# Patient Record
Sex: FEMALE | URBAN - METROPOLITAN AREA
[De-identification: names, ages, dates, MRNs, and addresses within clinical notes are randomized per-mention and may not be internally consistent; named-entity substitution may affect disease eponyms.]

---

## 2017-04-26 ENCOUNTER — OFFICE VISIT (OUTPATIENT)
Dept: FAMILY MEDICINE | Facility: CLINIC | Age: 32
End: 2017-04-26
Payer: COMMERCIAL

## 2017-04-26 VITALS
SYSTOLIC BLOOD PRESSURE: 100 MMHG | WEIGHT: 174.8 LBS | HEART RATE: 75 BPM | DIASTOLIC BLOOD PRESSURE: 60 MMHG | BODY MASS INDEX: 32.17 KG/M2 | OXYGEN SATURATION: 99 % | TEMPERATURE: 97.9 F | HEIGHT: 62 IN

## 2017-04-26 DIAGNOSIS — Z00.00 ROUTINE GENERAL MEDICAL EXAMINATION AT A HEALTH CARE FACILITY: Primary | ICD-10-CM

## 2017-04-26 DIAGNOSIS — R53.83 FATIGUE, UNSPECIFIED TYPE: ICD-10-CM

## 2017-04-26 LAB
ERYTHROCYTE [DISTWIDTH] IN BLOOD BY AUTOMATED COUNT: 15.8 % (ref 10–15)
HCT VFR BLD AUTO: 35.1 % (ref 35–47)
HGB BLD-MCNC: 10.9 G/DL (ref 11.7–15.7)
MCH RBC QN AUTO: 21.5 PG (ref 26.5–33)
MCHC RBC AUTO-ENTMCNC: 31.1 G/DL (ref 31.5–36.5)
MCV RBC AUTO: 69 FL (ref 78–100)
PLATELET # BLD AUTO: 306 10E9/L (ref 150–450)
RBC # BLD AUTO: 5.07 10E12/L (ref 3.8–5.2)
WBC # BLD AUTO: 7.7 10E9/L (ref 4–11)

## 2017-04-26 PROCEDURE — G0145 SCR C/V CYTO,THINLAYER,RESCR: HCPCS | Performed by: FAMILY MEDICINE

## 2017-04-26 PROCEDURE — 82306 VITAMIN D 25 HYDROXY: CPT | Performed by: FAMILY MEDICINE

## 2017-04-26 PROCEDURE — 82947 ASSAY GLUCOSE BLOOD QUANT: CPT | Performed by: FAMILY MEDICINE

## 2017-04-26 PROCEDURE — 36415 COLL VENOUS BLD VENIPUNCTURE: CPT | Performed by: FAMILY MEDICINE

## 2017-04-26 PROCEDURE — 84443 ASSAY THYROID STIM HORMONE: CPT | Performed by: FAMILY MEDICINE

## 2017-04-26 PROCEDURE — 99395 PREV VISIT EST AGE 18-39: CPT | Performed by: FAMILY MEDICINE

## 2017-04-26 PROCEDURE — 87624 HPV HI-RISK TYP POOLED RSLT: CPT | Performed by: FAMILY MEDICINE

## 2017-04-26 PROCEDURE — 85027 COMPLETE CBC AUTOMATED: CPT | Performed by: FAMILY MEDICINE

## 2017-04-26 NOTE — PROGRESS NOTES
SUBJECTIVE:     CC: Luma Marks is an 31 year old woman who presents for preventive health visit.     Healthy Habits:    Do you get at least three servings of calcium containing foods daily (dairy, green leafy vegetables, etc.)? yes    Amount of exercise or daily activities, outside of work: 2 day(s) per week    Problems taking medications regularly not applicable    Medication side effects: No    Have you had an eye exam in the past two years? yes    Do you see a dentist twice per year? no    Do you have sleep apnea, excessive snoring or daytime drowsiness?yes    Having periods for only 1-2 days during cycle. Worried that she has gained a lot of weight. Currently she is not physically active. Reports of fatigue. After 1-2 hours of work she cannot work anymore. Does not work. Has a 4-year-old child at home        Today's PHQ-2 Score:   PHQ-2 ( 1999 Pf[employer]) 4/26/2017 9/22/2016   Q1: Little interest or pleasure in doing things 0 0   Q2: Feeling down, depressed or hopeless 0 0   PHQ-2 Score 0 0       Abuse: Current or Past(Physical, Sexual or Emotional)- No  Do you feel safe in your environment - Yes    Social History   Substance Use Topics     Smoking status: Never Smoker     Smokeless tobacco: Never Used     Alcohol use No     The patient does not drink >3 drinks per day nor >7 drinks per week.    No results for input(s): CHOL, HDL, LDL, TRIG, CHOLHDLRATIO, NHDL in the last 99203 hours.    Reviewed orders with patient.  Reviewed health maintenance and updated orders accordingly - Yes    Mammo Decision Support:  Mammogram not appropriate for this patient based on age.    Pertinent mammograms are reviewed under the imaging tab.  History of abnormal Pap smear: NO - age 30- 65 PAP every 3 years recommended    Reviewed and updated as needed this visit by clinical staff  Tobacco  Allergies  Meds  Med Hx  Soc Hx        Reviewed and updated as needed this visit by Provider  Tobacco  Allergies  Med Hx  Soc Hx     "       ROS:  C: NEGATIVE for fever, chills, change in weight  I: NEGATIVE for worrisome rashes, moles or lesions  E: NEGATIVE for vision changes or irritation  ENT: NEGATIVE for ear, mouth and throat problems  R: NEGATIVE for significant cough or SOB  B: NEGATIVE for masses, tenderness or discharge  CV: NEGATIVE for chest pain, palpitations or peripheral edema  GI: NEGATIVE for nausea, abdominal pain, heartburn, or change in bowel habits  : NEGATIVE for unusual urinary or vaginal symptoms. Periods are regular.  M: NEGATIVE for significant arthralgias or myalgia  N: NEGATIVE for weakness, dizziness or paresthesias  P: NEGATIVE for changes in mood or affect    Patient Active Problem List   Diagnosis   (none) - all problems resolved or deleted     No past surgical history on file.    Social History   Substance Use Topics     Smoking status: Never Smoker     Smokeless tobacco: Never Used     Alcohol use No     Family History   Problem Relation Age of Onset     Family History Negative Mother      DIABETES Father      Hypertension Father          No current outpatient prescriptions on file.     No Known Allergies  OBJECTIVE:     /60 (BP Location: Left arm, Cuff Size: Adult Regular)  Pulse 75  Temp 97.9  F (36.6  C) (Tympanic)  Ht 5' 2\" (1.575 m)  Wt 174 lb 12.8 oz (79.3 kg)  LMP 04/12/2017  SpO2 99%  BMI 31.97 kg/m2  EXAM:  GENERAL: healthy, alert and no distress  EYES: Eyes grossly normal to inspection, PERRL and conjunctivae and sclerae normal  HENT: ear canals and TM's normal, nose and mouth without ulcers or lesions  NECK: no adenopathy, no asymmetry, masses, or scars and thyroid normal to palpation  RESP: lungs clear to auscultation - no rales, rhonchi or wheezes  BREAST: normal without masses, tenderness or nipple discharge and no palpable axillary masses or adenopathy  CV: regular rate and rhythm, normal S1 S2, no S3 or S4, no murmur, click or rub, no peripheral edema and peripheral pulses " "strong  ABDOMEN: soft, nontender, no hepatosplenomegaly, no masses and bowel sounds normal   (female): normal female external genitalia, normal urethral meatus, vaginal mucosa pink, moist, well rugated, and normal cervix/adnexa/uterus without masses or discharge  MS: no gross musculoskeletal defects noted, no edema  SKIN: no suspicious lesions or rashes  NEURO: Normal strength and tone, mentation intact and speech normal  PSYCH: mentation appears normal, affect normal/bright    ASSESSMENT/PLAN:     1. Routine general medical examination at a health care facility  Screening Pap smear obtained.  - Pap imaged thin layer screen with HPV - recommended age 30 - 65  - HPV High Risk Types DNA Cervical    2. Fatigue, unspecified type  questionable etiology. Patient reassured that as long as her periods are once a month, I have no concerns, about having shorter periods.  - TSH with free T4 reflex  - CBC with platelets  - Glucose  - Vitamin D Deficiency      COUNSELING:   Reviewed preventive health counseling, as reflected in patient instructions       Regular exercise       Healthy diet/nutrition       Folic Acid Counseling         reports that she has never smoked. She has never used smokeless tobacco.    Estimated body mass index is 31.97 kg/(m^2) as calculated from the following:    Height as of this encounter: 5' 2\" (1.575 m).    Weight as of this encounter: 174 lb 12.8 oz (79.3 kg).   Weight management plan: Discussed healthy diet and exercise guidelines and patient will follow up in 12 months in clinic to re-evaluate.    Counseling Resources:  ATP IV Guidelines  Pooled Cohorts Equation Calculator  Breast Cancer Risk Calculator  FRAX Risk Assessment  ICSI Preventive Guidelines  Dietary Guidelines for Americans, 2010  USDA's MyPlate  ASA Prophylaxis  Lung CA Screening    Alayna Ann MD  [facility] [place]  "

## 2017-04-26 NOTE — LETTER
[facility] [place]          [address] [place] [address]                            [phone]  Fax: [phone]  April 27, 2017     Luma Marks  [address]   [place] [facility] [address]      Dear Luma,    I have reviewed your recent labs. Here are the results:    -TSH (thyroid stimulating hormone) level is normal which indicates normal thyroid function.    -Hemoglobin is decreased indicating anemia.  Anemia can cause fatigue and, occasionally, light-headedness.  This is common in menstruating women and is usually caused by an iron deficiency.      ADVISE: a diet higher in iron rich foods such as lean red meat and green, leafy vegetables and a daily iron supplement (325mg twice daily) is also useful. Then rechecking in 3 months     -White blood cell and platelet counts are normal.  -Glucose (diabetic screening test) is normal.    -Vitamin D level is low and oral supplementation should be started.      ADVISE: starting over the counter Vitamin D3  2000 IU - 3 tabs (6000 IU) daily for 6 weeks and then 2000 IU daily to maintain levels.  In 3 months, please schedule a lab only appointment to recheck Vitamin D levels (Vit D deficiency, DX: Vitamin D Deficiency)    Results for orders placed or performed in visit on 04/26/17   TSH with free T4 reflex   Result Value Ref Range    TSH 2.52 0.40 - 4.00 mU/L   CBC with platelets   Result Value Ref Range    WBC 7.7 4.0 - 11.0 10e9/L    RBC Count 5.07 3.8 - 5.2 10e12/L    Hemoglobin 10.9 (L) 11.7 - 15.7 g/dL    Hematocrit 35.1 35.0 - 47.0 %    MCV 69 (L) 78 - 100 fl    MCH 21.5 (L) 26.5 - 33.0 pg    MCHC 31.1 (L) 31.5 - 36.5 g/dL    RDW 15.8 (H) 10.0 - 15.0 %    Platelet Count 306 150 - 450 10e9/L   Glucose   Result Value Ref Range    Glucose 89 70 - 99 mg/dL   Vitamin D Deficiency   Result Value Ref Range    Vitamin D Deficiency screening 9 (L) 20 - 75 ug/L         Thank you for  choosing [facility].  We appreciate the opportunity to serve you and look forward to supporting your healthcare needs in the future.    If you have any questions or concerns, please call me or my staff at [phone].      Sincerely,      Seth Catalan M.D.

## 2017-04-26 NOTE — LETTER
May 2, 2017    Luma Marks  [address]   [place] [address]    Dear Luma,  We are happy to inform you that your PAP smear result from 04/26/17 is normal.  We are now able to do a follow up test on PAP smears. The DNA test is for HPV (Human Papilloma Virus). Cervical cancer is closely linked with certain types of HPV. Your result showed no evidence of high risk HPV.  Therefore we recommend you return in 3 years for your next pap smear.  You will still need to return to the clinic every year for an annual exam and other preventive tests.  Please contact the clinic at [phone] with any questions.  Sincerely,    Alayna Ann MD/artem

## 2017-04-26 NOTE — NURSING NOTE
"Chief Complaint   Patient presents with     Physical     fasting       Initial /60 (BP Location: Left arm, Cuff Size: Adult Regular)  Pulse 75  Temp 97.9  F (36.6  C) (Tympanic)  Ht 5' 2\" (1.575 m)  Wt 174 lb 12.8 oz (79.3 kg)  LMP 04/12/2017  SpO2 99%  BMI 31.97 kg/m2 Estimated body mass index is 31.97 kg/(m^2) as calculated from the following:    Height as of this encounter: 5' 2\" (1.575 m).    Weight as of this encounter: 174 lb 12.8 oz (79.3 kg).  Medication Reconciliation: complete  "

## 2017-04-27 LAB
DEPRECATED CALCIDIOL+CALCIFEROL SERPL-MC: 9 UG/L (ref 20–75)
GLUCOSE SERPL-MCNC: 89 MG/DL (ref 70–99)
TSH SERPL DL<=0.005 MIU/L-ACNC: 2.52 MU/L (ref 0.4–4)

## 2017-04-28 LAB
COPATH REPORT: NORMAL
PAP: NORMAL

## 2017-05-01 LAB
FINAL DIAGNOSIS: NORMAL
HPV HR 12 DNA CVX QL NAA+PROBE: NEGATIVE
HPV16 DNA SPEC QL NAA+PROBE: NEGATIVE
HPV18 DNA SPEC QL NAA+PROBE: NEGATIVE
SPECIMEN DESCRIPTION: NORMAL

## 2017-05-08 ENCOUNTER — OFFICE VISIT (OUTPATIENT)
Dept: FAMILY MEDICINE | Facility: CLINIC | Age: 32
End: 2017-05-08
Payer: COMMERCIAL

## 2017-05-08 VITALS
WEIGHT: 175.2 LBS | BODY MASS INDEX: 32.24 KG/M2 | OXYGEN SATURATION: 98 % | HEIGHT: 62 IN | HEART RATE: 88 BPM | SYSTOLIC BLOOD PRESSURE: 94 MMHG | DIASTOLIC BLOOD PRESSURE: 60 MMHG | TEMPERATURE: 98.5 F

## 2017-05-08 DIAGNOSIS — R30.0 DYSURIA: Primary | ICD-10-CM

## 2017-05-08 LAB
ALBUMIN UR-MCNC: NEGATIVE MG/DL
APPEARANCE UR: CLEAR
BILIRUB UR QL STRIP: NEGATIVE
COLOR UR AUTO: YELLOW
GLUCOSE UR STRIP-MCNC: NEGATIVE MG/DL
HGB UR QL STRIP: NEGATIVE
KETONES UR STRIP-MCNC: ABNORMAL MG/DL
LEUKOCYTE ESTERASE UR QL STRIP: NEGATIVE
NITRATE UR QL: NEGATIVE
PH UR STRIP: 5 PH (ref 5–7)
SP GR UR STRIP: 1.02 (ref 1–1.03)
URN SPEC COLLECT METH UR: ABNORMAL
UROBILINOGEN UR STRIP-ACNC: 0.2 EU/DL (ref 0.2–1)

## 2017-05-08 PROCEDURE — 81003 URINALYSIS AUTO W/O SCOPE: CPT | Performed by: FAMILY MEDICINE

## 2017-05-08 PROCEDURE — 99213 OFFICE O/P EST LOW 20 MIN: CPT | Performed by: FAMILY MEDICINE

## 2017-05-08 NOTE — MR AVS SNAPSHOT
"              After Visit Summary   2017    Luma Marks    MRN: [id number]           Patient Information     Date Of Birth          1985        Visit Information        Provider Department      2017 1:00 PM Alayna Ann MD [facility] [place]        Today's Diagnoses     Dysuria    -  1       Follow-ups after your visit        Who to contact     If you have questions or need follow up information about today's clinic visit or your schedule please contact [facility] [place] directly at [phone].  Normal or non-critical lab and imaging results will be communicated to you by [employer], letter or phone within 4 business days after the clinic has received the results. If you do not hear from us within 7 days, please contact the clinic through [employer] or phone. If you have a critical or abnormal lab result, we will notify you by phone as soon as possible.  Submit refill requests through [employer] or call your pharmacy and they will forward the refill request to us. Please allow 3 business days for your refill to be completed.          Additional Information About Your Visit        MyChart Information     [employer] lets you send messages to your doctor, view your test results, renew your prescriptions, schedule appointments and more. To sign up, go to [email or web address] . Click on \"Log in\" on the left side of the screen, which will take you to the Welcome page. Then click on \"Sign up Now\" on the right side of the page.     You will be asked to enter the access code listed below, as well as some personal information. Please follow the directions to create your username and password.     Your access code is: [email or web address]  Expires: 2017  9:22 AM     Your access code will  in 90 days. If you need help or a new code, please call your [place] clinic or [phone].        Care EveryWhere ID     This is your Care EveryWhere ID. This could be used by other organizations to " "access your [facility] medical records  [id number]        Your Vitals Were     Pulse Temperature Height Last Period Pulse Oximetry BMI (Body Mass Index)    88 98.5  F (36.9  C) (Tympanic) 5' 2\" (1.575 m) 04/12/2017 98% 32.04 kg/m2       Blood Pressure from Last 3 Encounters:   05/08/17 94/60   04/26/17 100/60   09/22/16 102/60    Weight from Last 3 Encounters:   05/08/17 175 lb 3.2 oz (79.5 kg)   04/26/17 174 lb 12.8 oz (79.3 kg)   09/22/16 163 lb (73.9 kg)              We Performed the Following     UA reflex to Microscopic and Culture        Primary Care Provider Office Phone # Fax #    Alayna Ann [phone]       [facility] [address] [place] [address] [address]  [place] [address]        Thank you!     Thank you for choosing [facility]  for your care. Our goal is always to provide you with excellent care. Hearing back from our patients is one way we can continue to improve our services. Please take a few minutes to complete the written survey that you may receive in the mail after your visit with us. Thank you!             Your Updated Medication List - Protect others around you: Learn how to safely use, store and throw away your medicines at www.disposemymeds.org.          This list is accurate as of: 5/8/17  1:25 PM.  Always use your most recent med list.                   Brand Name Dispense Instructions for use    IRON SUPPLEMENT PO      Take 325 mg by mouth daily (with breakfast)       VITAMIN D (CHOLECALCIFEROL) PO      Take by mouth daily Takes 6,000 units daily         "

## 2017-05-08 NOTE — PROGRESS NOTES
SUBJECTIVE:                                                    Luma Marks is a 31 year old female who presents to clinic today for the following health issues:      URINARY TRACT SYMPTOMS     Onset: 8 days    Description:   Painful urination (Dysuria): YES  Blood in urine (Hematuria): no   Delay in urine (Hesitency): no     Intensity: moderate, severe    Progression of Symptoms:  same    Accompanying Signs & Symptoms:  Fever/chills: no   Flank pain YES  Nausea and vomiting: no   Any vaginal symptoms: none  Abdominal/Pelvic Pain: no    History:   History of frequent UTI's: no   History of kidney stones: no   Sexually Active: YES  Possibility of pregnancy: No    Precipitating factors:            Therapies Tried and outcome: Increase fluid intake and cranberry tablets          Problem list and histories reviewed & adjusted, as indicated.  Additional history: as documented    Patient Active Problem List   Diagnosis   (none) - all problems resolved or deleted     No past surgical history on file.    Social History   Substance Use Topics     Smoking status: Never Smoker     Smokeless tobacco: Never Used     Alcohol use No     Family History   Problem Relation Age of Onset     Family History Negative Mother      DIABETES Father      Hypertension Father          Current Outpatient Prescriptions   Medication Sig Dispense Refill     VITAMIN D, CHOLECALCIFEROL, PO Take by mouth daily Takes 6,000 units daily       Ferrous Sulfate (IRON SUPPLEMENT PO) Take 325 mg by mouth daily (with breakfast)       No Known Allergies    Reviewed and updated as needed this visit by clinical staff       Reviewed and updated as needed this visit by Provider         ROS:  C: NEGATIVE for fever, chills, change in weight  E/M: NEGATIVE for ear, mouth and throat problems  R: NEGATIVE for significant cough or SOB  CV: NEGATIVE for chest pain, palpitations or peripheral edema    OBJECTIVE:                                                    BP  "94/60 (BP Location: Left arm, Cuff Size: Adult Regular)  Pulse 88  Temp 98.5  F (36.9  C) (Tympanic)  Ht 5' 2\" (1.575 m)  Wt 175 lb 3.2 oz (79.5 kg)  LMP 04/12/2017  SpO2 98%  BMI 32.04 kg/m2  Body mass index is 32.04 kg/(m^2).   GENERAL: healthy, alert, well nourished, well hydrated, no distress  HENT: ear canals- normal; TMs- normal; Nose- normal; Mouth- no ulcers, no lesions  NECK: no tenderness, no adenopathy, no asymmetry, no masses, no stiffness; thyroid- normal to palpation  RESP: lungs clear to auscultation - no rales, no rhonchi, no wheezes  CV: regular rates and rhythm, normal S1 S2, no S3 or S4 and no murmur, no click or rub -  ABDOMEN: soft, no tenderness, no  hepatosplenomegaly, no masses, normal bowel sounds  BACK: no CVA tenderness, no paralumbar tenderness    Results for orders placed or performed in visit on 05/08/17   UA reflex to Microscopic and Culture   Result Value Ref Range    Color Urine Yellow     Appearance Urine Clear     Glucose Urine Negative NEG mg/dL    Bilirubin Urine Negative NEG    Ketones Urine Trace (A) NEG mg/dL    Specific Gravity Urine 1.025 1.003 - 1.035    Blood Urine Negative NEG    pH Urine 5.0 5.0 - 7.0 pH    Protein Albumin Urine Negative NEG mg/dL    Urobilinogen Urine 0.2 0.2 - 1.0 EU/dL    Nitrite Urine Negative NEG    Leukocyte Esterase Urine Negative NEG    Source Midstream Urine           ASSESSMENT/PLAN:                                                        ICD-10-CM    1. Dysuria R30.0 UA reflex to Microscopic and Culture     No signs of UTI. Recommended to stay well hydrated.   May use OTC AZO for dysuria.   If any changes in symptoms noted, recommended to notify me back  Follow up with Provider - as needed     Alayna Ann MD  [facility] [place]  "

## 2017-05-08 NOTE — NURSING NOTE
"Chief Complaint   Patient presents with     Gastrointestinal Problem     Abnormal Labs     UTI       Initial BP 94/60 (BP Location: Left arm, Cuff Size: Adult Regular)  Pulse 88  Temp 98.5  F (36.9  C) (Tympanic)  Ht 5' 2\" (1.575 m)  Wt 175 lb 3.2 oz (79.5 kg)  LMP 04/12/2017  SpO2 98%  BMI 32.04 kg/m2 Estimated body mass index is 32.04 kg/(m^2) as calculated from the following:    Height as of this encounter: 5' 2\" (1.575 m).    Weight as of this encounter: 175 lb 3.2 oz (79.5 kg).  Medication Reconciliation: complete  "

## 2017-05-16 ENCOUNTER — TELEPHONE (OUTPATIENT)
Dept: FAMILY MEDICINE | Facility: CLINIC | Age: 32
End: 2017-05-16

## 2017-05-16 NOTE — TELEPHONE ENCOUNTER
"05/16/17        PHS call not required completed HM screening      Canh \"Lisha\" Gurdeep  Central Scheduler          "

## 2017-09-22 ENCOUNTER — OFFICE VISIT (OUTPATIENT)
Dept: FAMILY MEDICINE | Facility: CLINIC | Age: 32
End: 2017-09-22
Payer: COMMERCIAL

## 2017-09-22 VITALS
SYSTOLIC BLOOD PRESSURE: 100 MMHG | OXYGEN SATURATION: 99 % | BODY MASS INDEX: 31.47 KG/M2 | HEIGHT: 62 IN | HEART RATE: 84 BPM | TEMPERATURE: 98.7 F | DIASTOLIC BLOOD PRESSURE: 74 MMHG | WEIGHT: 171 LBS

## 2017-09-22 DIAGNOSIS — K62.89 PERIANAL PAIN: Primary | ICD-10-CM

## 2017-09-22 DIAGNOSIS — Z23 NEED FOR PROPHYLACTIC VACCINATION AND INOCULATION AGAINST INFLUENZA: ICD-10-CM

## 2017-09-22 PROCEDURE — 99213 OFFICE O/P EST LOW 20 MIN: CPT | Mod: 25 | Performed by: FAMILY MEDICINE

## 2017-09-22 PROCEDURE — 90686 IIV4 VACC NO PRSV 0.5 ML IM: CPT | Performed by: FAMILY MEDICINE

## 2017-09-22 PROCEDURE — 90471 IMMUNIZATION ADMIN: CPT | Performed by: FAMILY MEDICINE

## 2017-09-22 NOTE — NURSING NOTE
"Chief Complaint   Patient presents with     Rectal Problem       Initial /74  Pulse 84  Temp 98.7  F (37.1  C) (Tympanic)  Ht 5' 2\" (1.575 m)  Wt 171 lb (77.6 kg)  SpO2 99%  BMI 31.28 kg/m2 Estimated body mass index is 31.28 kg/(m^2) as calculated from the following:    Height as of this encounter: 5' 2\" (1.575 m).    Weight as of this encounter: 171 lb (77.6 kg).  Medication Reconciliation: complete     Elizabeth Howard CMA      "

## 2017-09-22 NOTE — PROGRESS NOTES
"  SUBJECTIVE:   Luma Marks is a 31 year old female who presents to clinic today for the following health issues:      Concern - rectal pain  Onset: x couple days    Description:   Rectal pain when passing stools.  No blood in the stool. Not constipation    Intensity: mild    Progression of Symptoms:  worsening    Accompanying Signs & Symptoms:  No bleeding    Patient tells that she has a BM every day. She always feels incomplete emptying therefore she sits longer and strain. Denies any dark stools. Denies any abdominal discomfort. No nausea, vomiting or heartburn. No recent change in appetite.      Problem list and histories reviewed & adjusted, as indicated.  Additional history: as documented    Patient Active Problem List   Diagnosis   (none) - all problems resolved or deleted     History reviewed. No pertinent surgical history.    Social History   Substance Use Topics     Smoking status: Never Smoker     Smokeless tobacco: Never Used     Alcohol use No     Family History   Problem Relation Age of Onset     Family History Negative Mother      DIABETES Father      Hypertension Father          Current Outpatient Prescriptions   Medication Sig Dispense Refill     hydrocortisone (ANUSOL-HC) 2.5 % cream Place rectally 2 times daily For 4-5 days as needed 30 g 1     VITAMIN D, CHOLECALCIFEROL, PO Take by mouth daily Takes 6,000 units daily       Ferrous Sulfate (IRON SUPPLEMENT PO) Take 325 mg by mouth daily (with breakfast)       No Known Allergies      Reviewed and updated as needed this visit by clinical staff     Reviewed and updated as needed this visit by Provider         ROS:  C: NEGATIVE for fever, chills, change in weight  E/M: NEGATIVE for ear, mouth and throat problems  R: NEGATIVE for significant cough or SOB  CV: NEGATIVE for chest pain, palpitations or peripheral edema    OBJECTIVE:                                                    /74  Pulse 84  Temp 98.7  F (37.1  C) (Tympanic)  Ht 5' 2\" " (1.575 m)  Wt 171 lb (77.6 kg)  SpO2 99%  BMI 31.28 kg/m2  Body mass index is 31.28 kg/(m^2).   GENERAL: healthy, alert, well nourished, well hydrated, no distress  HENT: ear canals- normal; TMs- normal; Nose- normal; Mouth- no ulcers, no lesions  RESP: lungs clear to auscultation - no rales, no rhonchi, no wheezes  CV: regular rates and rhythm, normal S1 S2, no S3 or S4 and no murmur, no click or rub -  ABDOMEN: soft, no tenderness, no  hepatosplenomegaly, no masses, normal bowel sounds  RECTAL- female: no masses, no hemorrhoids. No active bleeding noted. Mild erythema noted around the perianal area.          ASSESSMENT/PLAN:                                                      1. Perianal pain  No external hemorrhoids noted. Patient has slight irritation around the perianal area. Recommended to use Anusol cream as needed. Recommended to avoid prolonged straining. Patient is not constipated but recommended to use more fluids and fiber to stay regular. May add probiotics if needed. If this is not treated well, it may cause hemorrhoid development.  - hydrocortisone (ANUSOL-HC) 2.5 % cream; Place rectally 2 times daily For 4-5 days as needed  Dispense: 30 g; Refill: 1    2. Need for prophylactic vaccination and inoculation against influenza    - FLU VAC, SPLIT VIRUS IM > 3 YO (QUADRIVALENT) [25015]  - Vaccine Administration, Initial [01323]      Follow up with Provider - if not improving.      Alayna Ann MD  [facility]  Injectable Influenza Immunization Documentation    1.  Is the person to be vaccinated sick today?   No    2. Does the person to be vaccinated have an allergy to a component   of the vaccine?   No    3. Has the person to be vaccinated ever had a serious reaction   to influenza vaccine in the past?   No    4. Has the person to be vaccinated ever had Guillain-Barré syndrome?   No    Form completed by Elizabeth Howard CMA

## 2017-09-22 NOTE — MR AVS SNAPSHOT
After Visit Summary   9/22/2017    Luma Marks    MRN: [id number]           Patient Information     Date Of Birth          1985        Visit Information        Provider Department      9/22/2017 2:00 PM Alayna Ann MD [facility]        Today's Diagnoses     Perianal pain    -  1    Need for prophylactic vaccination and inoculation against influenza           Follow-ups after your visit        Follow-up notes from your care team     Return in about 5 days (around 9/27/2017), or if symptoms worsen or fail to improve.      Your next 10 appointments already scheduled     Sep 29, 2017  3:00 PM CDT   Office Visit with Alayna Ann MD   [facility] [place] ([facility])    [address] [place] [id number] [phone]           Bring a current list of meds and any records pertaining to this visit. For Physicals, please bring immunization records and any forms needing to be filled out. Please arrive 10 minutes early to complete paperwork.              Who to contact     If you have questions or need follow up information about today's clinic visit or your schedule please contact [facility] directly at [phone].  Normal or non-critical lab and imaging results will be communicated to you by MyChart, letter or phone within 4 business days after the clinic has received the results. If you do not hear from us within 7 days, please contact the clinic through On The Net Yeth[employer] or phone. If you have a critical or abnormal lab result, we will notify you by phone as soon as possible.  Submit refill requests through [email or web address] or call your pharmacy and they will forward the refill request to us. Please allow 3 business days for your refill to be completed.          Additional Information About Your Visit        On The Net YetharYatra Information     [email or web address] lets you send messages to your doctor, view your test results, renew your prescriptions,  "schedule appointments and more. To sign up, go to [email or web address] . Click on \"Log in\" on the left side of the screen, which will take you to the Welcome page. Then click on \"Sign up Now\" on the right side of the page.     You will be asked to enter the access code listed below, as well as some personal information. Please follow the directions to create your username and password.     Your access code is: [email or web address]  Expires: 2017  3:33 PM     Your access code will  in 90 days. If you need help or a new code, please call your [place] clinic or [phone].        Care EveryWhere ID     This is your Care EveryWhere ID. This could be used by other organizations to access your [place] medical records  [id number]        Your Vitals Were     Pulse Temperature Height Pulse Oximetry BMI (Body Mass Index)       84 98.7  F (37.1  C) (Tympanic) 5' 2\" (1.575 m) 99% 31.28 kg/m2        Blood Pressure from Last 3 Encounters:   17 100/74   17 94/60   17 100/60    Weight from Last 3 Encounters:   17 171 lb (77.6 kg)   17 175 lb 3.2 oz (79.5 kg)   17 174 lb 12.8 oz (79.3 kg)              We Performed the Following     FLU VAC, SPLIT VIRUS IM > 3 YO (QUADRIVALENT) [38333]     Vaccine Administration, Initial [90012]          Today's Medication Changes          These changes are accurate as of: 17 11:59 PM.  If you have any questions, ask your nurse or doctor.               Start taking these medicines.        Dose/Directions    hydrocortisone 2.5 % cream   Commonly known as:  ANUSOL-HC   Used for:  Perianal pain   Started by:  Alayna Ann MD        Place rectally 2 times daily For 4-5 days as needed   Quantity:  30 g   Refills:  1            Where to get your medicines      These medications were sent to [facility]/pharmacy #[id number]  [place] - [address]  [address] [address]     Phone:  [phone]     hydrocortisone 2.5 % cream    "             Primary Care Provider Office Phone # Fax #    Alayna Ann [phone] [address]  [place] [address]        Equal Access to Services     ED PAULINO : Oscar stone mickieo Sobaltazarali, waaxda luqadaha, qaybta kaalmada adehemanthyada, vilma june laLanieevelyn kenney. So [facility] [phone].    ATENCIÓN: Si habla español, tiene a pugh disposición servicios gratuitos de asistencia lingüística. Llame al [phone].    We comply with applicable federal civil rights laws and [place] laws. We do not discriminate on the basis of race, color, national origin, age, disability sex, sexual orientation or gender identity.            Thank you!     Thank you for choosing [facility] [place]  for your care. Our goal is always to provide you with excellent care. Hearing back from our patients is one way we can continue to improve our services. Please take a few minutes to complete the written survey that you may receive in the mail after your visit with us. Thank you!             Your Updated Medication List - Protect others around you: Learn how to safely use, store and throw away your medicines at www.disposemymeds.org.          This list is accurate as of: 9/22/17 11:59 PM.  Always use your most recent med list.                   Brand Name Dispense Instructions for use Diagnosis    hydrocortisone 2.5 % cream    ANUSOL-HC    30 g    Place rectally 2 times daily For 4-5 days as needed    Perianal pain       IRON SUPPLEMENT PO      Take 325 mg by mouth daily (with breakfast)        VITAMIN D (CHOLECALCIFEROL) PO      Take by mouth daily Takes 6,000 units daily

## 2017-09-27 ENCOUNTER — OFFICE VISIT (OUTPATIENT)
Dept: FAMILY MEDICINE | Facility: CLINIC | Age: 32
End: 2017-09-27
Payer: COMMERCIAL

## 2017-09-27 VITALS
WEIGHT: 173.4 LBS | TEMPERATURE: 98 F | OXYGEN SATURATION: 98 % | BODY MASS INDEX: 31.91 KG/M2 | SYSTOLIC BLOOD PRESSURE: 95 MMHG | HEIGHT: 62 IN | DIASTOLIC BLOOD PRESSURE: 63 MMHG | HEART RATE: 101 BPM

## 2017-09-27 DIAGNOSIS — K59.00 CONSTIPATION, UNSPECIFIED CONSTIPATION TYPE: Primary | ICD-10-CM

## 2017-09-27 DIAGNOSIS — K64.4 EXTERNAL HEMORRHOIDS: ICD-10-CM

## 2017-09-27 PROCEDURE — 99213 OFFICE O/P EST LOW 20 MIN: CPT | Performed by: FAMILY MEDICINE

## 2017-09-27 RX ORDER — ASPIRIN 81 MG
100 TABLET, DELAYED RELEASE (ENTERIC COATED) ORAL 2 TIMES DAILY
Qty: 60 TABLET | Refills: 1 | Status: SHIPPED | OUTPATIENT
Start: 2017-09-27

## 2017-09-27 NOTE — MR AVS SNAPSHOT
"              After Visit Summary   2017    Luma Marks    MRN: [id number]           Patient Information     Date Of Birth          1985        Visit Information        Provider Department      2017 3:00 PM [facility]        Today's Diagnoses     Constipation, unspecified constipation type    -  1    External hemorrhoids           Follow-ups after your visit        Follow-up notes from your care team     Return in about 5 days (around 10/2/2017), or if symptoms worsen or fail to improve.      Who to contact     If you have questions or need follow up information about today's clinic visit or your schedule please contact [facility] directly at [phone].  Normal or non-critical lab and imaging results will be communicated to you by [employer] C[employer]art, letter or phone within 4 business days after the clinic has received the results. If you do not hear from us within 7 days, please contact the clinic through [employer] or phone. If you have a critical or abnormal lab result, we will notify you by phone as soon as possible.  Submit refill requests through Hooked or call your pharmacy and they will forward the refill request to us. Please allow 3 business days for your refill to be completed.          Additional Information About Your Visit        Urban C[employer]ohart Information     Hooked lets you send messages to your doctor, view your test results, renew your prescriptions, schedule appointments and more. To sign up, go to [email or web address] . Click on \"Log in\" on the left side of the screen, which will take you to the Welcome page. Then click on \"Sign up Now\" on the right side of the page.     You will be asked to enter the access code listed below, as well as some personal information. Please follow the directions to create your username and password.     Your access code is: [email or web address]  Expires: 2017  3:33 PM     Your access code will  in 90 " "days. If you need help or a new code, please call your [place] clinic or [phone].        Care EveryWhere ID     This is your Care EveryWhere ID. This could be used by other organizations to access your [place] medical records  [id number]        Your Vitals Were     Pulse Temperature Height Last Period Pulse Oximetry Breastfeeding?    101 98  F (36.7  C) (Tympanic) 5' 2\" (1.575 m) 09/11/2017 (Approximate) 98% No    BMI (Body Mass Index)                   31.72 kg/m2            Blood Pressure from Last 3 Encounters:   09/27/17 95/63   09/22/17 100/74   05/08/17 94/60    Weight from Last 3 Encounters:   09/27/17 173 lb 6.4 oz (78.7 kg)   09/22/17 171 lb (77.6 kg)   05/08/17 175 lb 3.2 oz (79.5 kg)              Today, you had the following     No orders found for display         Today's Medication Changes          These changes are accurate as of: 9/27/17  3:11 PM.  If you have any questions, ask your nurse or doctor.               Start taking these medicines.        Dose/Directions    docusate sodium 100 MG tablet   Commonly known as:  COLACE   Used for:  Constipation, unspecified constipation type   Started by:  Alayna Ann MD        Dose:  100 mg   Take 100 mg by mouth 2 times daily   Quantity:  60 tablet   Refills:  1            Where to get your medicines      These medications were sent to [facility]/pharmacy #[id number] - [place] - [address]  [address] [place] [address], [place] [address]     Phone:  [phone]     docusate sodium 100 MG tablet                Primary Care Provider Office Phone # Fax #    Alayna Ann [phone]       [address]  [place] [address]        Equal Access to Services     [facility]: Oscar Hickman, waaxda luqadaha, qaybta kaalmada jessiyaleila, vilma kenney. So [facility] [phone].    ATENCIÓN: Si habla español, tiene a pugh disposición servicios gratuitos de asistencia lingüística. Llame al " [phone].    We comply with applicable federal civil rights laws and [place] laws. We do not discriminate on the basis of race, color, national origin, age, disability sex, sexual orientation or gender identity.            Thank you!     Thank you for choosing [facility] [place]  for your care. Our goal is always to provide you with excellent care. Hearing back from our patients is one way we can continue to improve our services. Please take a few minutes to complete the written survey that you may receive in the mail after your visit with us. Thank you!             Your Updated Medication List - Protect others around you: Learn how to safely use, store and throw away your medicines at www.disposemymeds.org.          This list is accurate as of: 9/27/17  3:11 PM.  Always use your most recent med list.                   Brand Name Dispense Instructions for use Diagnosis    docusate sodium 100 MG tablet    COLACE    60 tablet    Take 100 mg by mouth 2 times daily    Constipation, unspecified constipation type       hydrocortisone 2.5 % cream    ANUSOL-HC    30 g    Place rectally 2 times daily For 4-5 days as needed    Perianal pain

## 2017-09-27 NOTE — NURSING NOTE
"Chief Complaint   Patient presents with     RECHECK       Initial BP 95/63 (BP Location: Right arm, Patient Position: Chair, Cuff Size: Adult Regular)  Pulse 101  Temp 98  F (36.7  C) (Tympanic)  Ht 5' 2\" (1.575 m)  Wt 173 lb 6.4 oz (78.7 kg)  LMP 09/11/2017 (Approximate)  SpO2 98%  Breastfeeding? No  BMI 31.72 kg/m2 Estimated body mass index is 31.72 kg/(m^2) as calculated from the following:    Height as of this encounter: 5' 2\" (1.575 m).    Weight as of this encounter: 173 lb 6.4 oz (78.7 kg).  Medication Reconciliation: complete       Suzanne Benavides MA     "

## 2017-09-27 NOTE — PROGRESS NOTES
"  SUBJECTIVE:   Luma Marks is a 31 year old female who presents to clinic today for the following health issues:      Recheck : Perianal pain         Problem list and histories reviewed & adjusted, as indicated.  Additional history: as documented    Patient Active Problem List   Diagnosis   (none) - all problems resolved or deleted     History reviewed. No pertinent surgical history.    Social History   Substance Use Topics     Smoking status: Never Smoker     Smokeless tobacco: Never Used     Alcohol use No     Family History   Problem Relation Age of Onset     Family History Negative Mother      DIABETES Father      Hypertension Father          Current Outpatient Prescriptions   Medication Sig Dispense Refill     docusate sodium (COLACE) 100 MG tablet Take 100 mg by mouth 2 times daily 60 tablet 1     hydrocortisone (ANUSOL-HC) 2.5 % cream Place rectally 2 times daily For 4-5 days as needed 30 g 1     No Known Allergies      Reviewed and updated as needed this visit by clinical staff       Reviewed and updated as needed this visit by Provider         ROS:  C: NEGATIVE for fever, chills, change in weight  E/M: NEGATIVE for ear, mouth and throat problems  R: NEGATIVE for significant cough or SOB    OBJECTIVE:                                                    BP 95/63 (BP Location: Right arm, Patient Position: Chair, Cuff Size: Adult Regular)  Pulse 101  Temp 98  F (36.7  C) (Tympanic)  Ht 5' 2\" (1.575 m)  Wt 173 lb 6.4 oz (78.7 kg)  LMP 09/11/2017 (Approximate)  SpO2 98%  Breastfeeding? No  BMI 31.72 kg/m2  Body mass index is 31.72 kg/(m^2).   GENERAL: healthy, alert, well nourished, well hydrated, no distress  RESP: lungs clear to auscultation - no rales, no rhonchi, no wheezes  CV: regular rates and rhythm, normal S1 S2, no S3 or S4 and no murmur, no click or rub -  ABDOMEN: soft, no tenderness, no  hepatosplenomegaly, no masses, normal bowel sounds  RECTAL- female:  Perirectal tenderness. 1 non " thrombosed external hemorrhoid with out bleeding         ASSESSMENT/PLAN:                                                        ICD-10-CM    1. Constipation, unspecified constipation type K59.00 docusate sodium (COLACE) 100 MG tablet   2. External hemorrhoids K64.4      Recommended to use stool softener. Increase hydration. Avoid constipation. Use Anusol rectal cream bid. Use Tucks pads from over-the-counter. Sitz baths discussed. Use ibuprofen prn.  Follow up with Provider - if not improving in a few days.     Alayna Ann MD  [facility]